# Patient Record
Sex: MALE
[De-identification: names, ages, dates, MRNs, and addresses within clinical notes are randomized per-mention and may not be internally consistent; named-entity substitution may affect disease eponyms.]

---

## 2024-11-13 ENCOUNTER — DASHBOARD ENCOUNTERS (OUTPATIENT)
Age: 24
End: 2024-11-13

## 2024-11-13 ENCOUNTER — OFFICE VISIT (OUTPATIENT)
Dept: URBAN - NONMETROPOLITAN AREA CLINIC 2 | Facility: CLINIC | Age: 24
End: 2024-11-13
Payer: COMMERCIAL

## 2024-11-13 VITALS
WEIGHT: 123 LBS | BODY MASS INDEX: 24.15 KG/M2 | SYSTOLIC BLOOD PRESSURE: 109 MMHG | HEIGHT: 60 IN | TEMPERATURE: 98 F | HEART RATE: 84 BPM | DIASTOLIC BLOOD PRESSURE: 72 MMHG

## 2024-11-13 DIAGNOSIS — D50.0 IRON DEFICIENCY ANEMIA DUE TO CHRONIC BLOOD LOSS: ICD-10-CM

## 2024-11-13 PROBLEM — 724556004: Status: ACTIVE | Noted: 2024-11-13

## 2024-11-13 PROCEDURE — 99244 OFF/OP CNSLTJ NEW/EST MOD 40: CPT | Performed by: NURSE PRACTITIONER

## 2024-11-13 PROCEDURE — 99204 OFFICE O/P NEW MOD 45 MIN: CPT | Performed by: NURSE PRACTITIONER

## 2024-11-13 RX ORDER — BUSPIRONE HYDROCHLORIDE 5 MG/1
TABLET ORAL
Qty: 60 TABLET | Status: ACTIVE | COMMUNITY

## 2024-11-13 RX ORDER — FERROUS SULFATE TAB 325 MG (65 MG ELEMENTAL FE) 325 (65 FE) MG
TAB ORAL
Qty: 30 TABLET | Status: ACTIVE | COMMUNITY

## 2024-11-13 NOTE — PHYSICAL EXAM MUSCULOSKELETAL:
Send me home blood pressure readings over the next 1-2 weeks  Goal blood pressure is less than 140/90    Continue using the pain cream on the joints if you need for pain relief. You can also use hot water soak or heating pad to help with stiffness. You can try some stress ball exercises daily to help with hand joint stiffness.       Costochondritis exercises:        Wall slide. Stand with your back against the wall. Raise your arms up so that your fingertips point to the ceiling and your elbows are raised about shoulder height. Keeping your arms against the wall, raise them up overhead. Hold and then lower to the start position. Repeat 10 to 12 times per session.  Scapula squeeze. Stand straight and tall, reaching your head toward the ceiling and breathing comfortably. Squeeze your shoulder blades together and hold the squeeze as long as it feels comfortable. Release. Repeat five to 10 times per session.  Stretch the pecs. Stand just outside a doorway and reach your arms up as high as you can on either side of the frame. Supporting your weight with your hands, push your chest and body straight forward. Keep your abs tight so that your back doesn't wobble or arch. Hold for a few seconds and return to the starting position. Repeat five to 10 times per session.  Thorax stretch. Sit down with your legs straight out in front of you, keeping your back nice and straight. With your hands on the floor at the side of your thighs, lean forward slowly. Curve down over your legs so that your bellybutton moves toward your spine. Hold for a few seconds and slowly sit back up. Repeat five to 10 times per session.  Side stretch. Starting in the same seated position as you did for the thorax stretch, except place your hands on your lower thighs. Bend your right arm and lean forward. Try to get your right elbow down to the knee on that side, allowing your hand to slide across to your left leg. For extra stretch, hold your elbow on the  normal gait and station  inside of the right knee, using the pressure between them to turn your chest further toward the left. Slowly release and return to the start position. Repeat with the left side of the body. Alternate sides, stretching each side 10 to 12 times per session.

## 2024-11-27 LAB — OCCULT BLOOD, FECAL, IA: (no result)

## 2024-11-29 ENCOUNTER — WEB ENCOUNTER (OUTPATIENT)
Dept: URBAN - NONMETROPOLITAN AREA CLINIC 2 | Facility: CLINIC | Age: 24
End: 2024-11-29